# Patient Record
Sex: FEMALE | Race: WHITE | NOT HISPANIC OR LATINO | Employment: OTHER | URBAN - METROPOLITAN AREA
[De-identification: names, ages, dates, MRNs, and addresses within clinical notes are randomized per-mention and may not be internally consistent; named-entity substitution may affect disease eponyms.]

---

## 2022-08-11 ENCOUNTER — APPOINTMENT (OUTPATIENT)
Dept: LAB | Facility: HOSPITAL | Age: 67
End: 2022-08-11
Attending: ORTHOPAEDIC SURGERY
Payer: MEDICARE

## 2022-08-11 ENCOUNTER — HOSPITAL ENCOUNTER (OUTPATIENT)
Dept: RADIOLOGY | Facility: CLINIC | Age: 67
Discharge: HOME | End: 2022-08-11
Attending: NURSE PRACTITIONER
Payer: MEDICARE

## 2022-08-11 ENCOUNTER — OFFICE VISIT (OUTPATIENT)
Dept: ORTHOPEDICS | Facility: CLINIC | Age: 67
End: 2022-08-11
Payer: MEDICARE

## 2022-08-11 VITALS — WEIGHT: 250 LBS | HEIGHT: 72 IN | BODY MASS INDEX: 33.86 KG/M2

## 2022-08-11 DIAGNOSIS — S82.231A DISPLACED OBLIQUE FRACTURE OF SHAFT OF RIGHT TIBIA, INITIAL ENCOUNTER FOR CLOSED FRACTURE: ICD-10-CM

## 2022-08-11 DIAGNOSIS — S82.231A DISPLACED OBLIQUE FRACTURE OF SHAFT OF RIGHT TIBIA, INITIAL ENCOUNTER FOR CLOSED FRACTURE: Primary | ICD-10-CM

## 2022-08-11 PROCEDURE — 73590 XR TIBIA FIBULA 2 VIEW RIGHT: ICD-10-PCS | Mod: RT,,, | Performed by: NURSE PRACTITIONER

## 2022-08-11 PROCEDURE — 73610 XR ANKLE COMPLETE 3 VIEW RIGHT: ICD-10-PCS | Mod: RT,,, | Performed by: NURSE PRACTITIONER

## 2022-08-11 PROCEDURE — 99204 OFFICE O/P NEW MOD 45 MIN: CPT | Mod: 57,,, | Performed by: ORTHOPAEDIC SURGERY

## 2022-08-11 PROCEDURE — 73610 X-RAY EXAM OF ANKLE: CPT | Mod: RT,,, | Performed by: NURSE PRACTITIONER

## 2022-08-11 PROCEDURE — 73590 X-RAY EXAM OF LOWER LEG: CPT | Mod: RT,,, | Performed by: NURSE PRACTITIONER

## 2022-08-11 PROCEDURE — 99204 PR OFFICE/OUTPT VISIT, NEW, LEVL IV, 45-59 MIN: ICD-10-PCS | Mod: 57,,, | Performed by: ORTHOPAEDIC SURGERY

## 2022-08-11 RX ORDER — VALACYCLOVIR HYDROCHLORIDE 500 MG/1
500 TABLET, FILM COATED ORAL
COMMUNITY
Start: 2022-07-13

## 2022-08-11 RX ORDER — MUPIROCIN 20 MG/G
OINTMENT TOPICAL
Status: CANCELLED | OUTPATIENT
Start: 2022-08-11

## 2022-08-11 RX ORDER — TRAZODONE HYDROCHLORIDE 100 MG/1
100 TABLET ORAL NIGHTLY
COMMUNITY
Start: 2022-07-19

## 2022-08-11 RX ORDER — HYDROCODONE BITARTRATE AND ACETAMINOPHEN 10; 325 MG/1; MG/1
1 TABLET ORAL 3 TIMES DAILY PRN
COMMUNITY
Start: 2022-08-08

## 2022-08-11 RX ORDER — LOPERAMIDE HYDROCHLORIDE 2 MG/1
2 CAPSULE ORAL 3 TIMES DAILY PRN
COMMUNITY
Start: 2022-08-08

## 2022-08-11 RX ORDER — RALOXIFENE HYDROCHLORIDE 60 MG/1
60 TABLET, FILM COATED ORAL DAILY
COMMUNITY
Start: 2022-08-08

## 2022-08-11 RX ORDER — BUPROPION HYDROCHLORIDE 100 MG/1
100 TABLET ORAL 2 TIMES DAILY
COMMUNITY
Start: 2022-07-25

## 2022-08-11 RX ORDER — LEVOCETIRIZINE DIHYDROCHLORIDE 5 MG/1
2.5 TABLET, FILM COATED ORAL
COMMUNITY
Start: 2022-07-13

## 2022-08-11 RX ORDER — FOLIC ACID 1 MG/1
1 TABLET ORAL
COMMUNITY
Start: 2022-07-13

## 2022-08-11 RX ORDER — BUSPIRONE HYDROCHLORIDE 10 MG/1
10 TABLET ORAL 2 TIMES DAILY
COMMUNITY
Start: 2022-07-11

## 2022-08-11 RX ORDER — GABAPENTIN 800 MG/1
800 TABLET ORAL 3 TIMES DAILY
COMMUNITY
Start: 2022-07-29

## 2022-08-11 NOTE — H&P (VIEW-ONLY)
Subjective:       Patient ID: Felicia Mae is a 67 y.o. female.    Chief Complaint   Patient presents with    Follow-up     4weeks right pilon fx, sx 7/14/22, pain all around the foot, nwb in wheelchair        Patient is here today for initial evaluation of injuries sustained to her right ankle.  She has a spiral distal tibia fracture with an old malunion of a medial malleolus and posterior malleolus fractures that occurred years ago.  She was seen and evaluated at an outside hospital and had a external fixator placed.  She was referred to me for management however she has been delayed due to difficulties with transportation.  She reports that she has pain and bloody drainage from her pin sites.  She has a very poor historian and has a poor understanding of her injury and the urgency to get it fixed.  She has no showed/rescheduled several appointments prior to this on.    Follow-up  Pertinent negatives include no abdominal pain, chest pain, chills, congestion, coughing, fever, nausea, neck pain, numbness or vomiting.       Review of Systems   Constitutional: Negative for chills, fever and malaise/fatigue.   HENT: Negative for congestion and hearing loss.    Eyes: Negative for visual disturbance.   Cardiovascular: Negative for chest pain and syncope.   Respiratory: Negative for cough and shortness of breath.    Hematologic/Lymphatic: Does not bruise/bleed easily.   Skin: Negative for color change and suspicious lesions.   Musculoskeletal: Negative for falls and neck pain.   Gastrointestinal: Negative for abdominal pain, nausea and vomiting.   Genitourinary: Negative for dysuria and hematuria.   Neurological: Negative for numbness and sensory change.   Psychiatric/Behavioral: Negative for altered mental status. The patient is not nervous/anxious.         Current Outpatient Medications on File Prior to Visit   Medication Sig Dispense Refill    buPROPion (WELLBUTRIN) 100 MG tablet Take 100 mg by mouth 2 (two)  "times daily.      busPIRone (BUSPAR) 10 MG tablet Take 10 mg by mouth 2 (two) times daily.      cetirizine 10 mg Cap Take 10 mg by mouth.      folic acid (FOLVITE) 1 MG tablet Take 1 mg by mouth.      gabapentin (NEURONTIN) 800 MG tablet Take 800 mg by mouth 3 (three) times daily.      HYDROcodone-acetaminophen (NORCO)  mg per tablet Take 1 tablet by mouth 3 (three) times daily as needed.      levocetirizine (XYZAL) 5 MG tablet Take 2.5 mg by mouth.      loperamide (IMODIUM) 2 mg capsule Take 2 mg by mouth 3 (three) times daily as needed.      raloxifene (EVISTA) 60 mg tablet Take 60 mg by mouth once daily.      traZODone (DESYREL) 100 MG tablet Take 100 mg by mouth nightly.      valACYclovir (VALTREX) 500 MG tablet Take 500 mg by mouth.       No current facility-administered medications on file prior to visit.          Objective:      Ht 6' 2" (1.88 m)   Wt 113.4 kg (250 lb)   BMI 32.10 kg/m²   Physical Exam  Constitutional:       General: She is not in acute distress.     Appearance: Normal appearance. She is obese. She is not ill-appearing.   HENT:      Head: Normocephalic and atraumatic.      Nose: No congestion.   Eyes:      Extraocular Movements: Extraocular movements intact.   Cardiovascular:      Rate and Rhythm: Normal rate and regular rhythm.      Pulses: Normal pulses.   Pulmonary:      Effort: Pulmonary effort is normal.      Breath sounds: Normal breath sounds.   Abdominal:      General: There is no distension.      Palpations: Abdomen is soft.      Tenderness: There is no abdominal tenderness.   Musculoskeletal:      Comments: Right lower extremity:  External fixator in place.  She has scabs with bloody drainage around the heel.  No erythema, no purulence.  She has generalized edema in the limb with ecchymosis over the dorsal aspect of the foot.  No blistering noted.  She has got good range of motion of the knee.  No calf swelling or tenderness, no signs of DVT.  She can move all of " her digits.  She has a palpable DP pulse.   Skin:     General: Skin is warm and dry.   Neurological:      Mental Status: She is alert and oriented to person, place, and time. Mental status is at baseline.   Psychiatric:         Mood and Affect: Mood normal.         Behavior: Behavior normal.         Thought Content: Thought content normal.         Judgment: Judgment normal.        Body mass index is 32.1 kg/m².    Radiology:   Right tibia two views, right ankle three views:  Images obtained today in the office demonstrate a spiral fracture of the distal tibia extending down into the metaphysis with an old malunion of her medial malleolus and posterior malleolus with valgus alignment of the ankle joint.      Assessment:         1. Displaced oblique fracture of shaft of right tibia, initial encounter for closed fracture  X-Ray Tibia Fibula 2 View Right    X-Ray Ankle Complete Right           Plan:       The patient is a she has no cardiopulmonary disease.  She has been treated in the past for breast cancer with lumpectomy and tolerated anesthesia well.  She is not on any med blood thinners currently.    The risks, benefits and alternatives treatment were discussed at length with the patient today including but not limited to pain, bleeding, scarring, infection, damage to neurovascular structures, malunion/nonunion, hardware failure/irritation, need for future procedures and complications leading to amputation and even death.  I feel as though she would benefit from removal of her external fixator with application of an intramedullary nail to stabilize her tibia fracture.  Plan to take her to the operating room tomorrow.  She will need to be NPO after midnight tonight.  We will obtain preoperative labs today.  She and her family understand and agree with our plan.     She currently receives [place] from her primary care physician and has received it each month for years.  I will not be providing her with any narcotic  pain medication.  Plan to take her to the operating room tomorrow.  They understand agree with all that we discussed.      Kike Dunbar MD  Orthopedic Trauma  [facility]      No follow-ups on file.    Displaced oblique fracture of shaft of right tibia, initial encounter for closed fracture  -     X-Ray Tibia Fibula 2 View Right; Future; Expected date: 08/11/2022  -     X-Ray Ankle Complete Right; Future; Expected date: 08/11/2022              Orders Placed This Encounter   Procedures    X-Ray Tibia Fibula 2 View Right     Standing Status:   Future     Number of Occurrences:   1     Standing Expiration Date:   8/11/2023     Order Specific Question:   May the Radiologist modify the order per protocol to meet the clinical needs of the patient?     Answer:   Yes     Order Specific Question:   Release to patient     Answer:   Immediate    X-Ray Ankle Complete Right     Standing Status:   Future     Number of Occurrences:   1     Standing Expiration Date:   8/11/2023     Order Specific Question:   May the Radiologist modify the order per protocol to meet the clinical needs of the patient?     Answer:   Yes     Order Specific Question:   Release to patient     Answer:   Immediate       No future appointments.

## 2022-08-11 NOTE — PROGRESS NOTES
Subjective:       Patient ID: Felicia Mae is a 67 y.o. female.    Chief Complaint   Patient presents with    Follow-up     4weeks right pilon fx, sx 7/14/22, pain all around the foot, nwb in wheelchair        Patient is here today for initial evaluation of injuries sustained to her right ankle.  She has a spiral distal tibia fracture with an old malunion of a medial malleolus and posterior malleolus fractures that occurred years ago.  She was seen and evaluated at an outside hospital and had a external fixator placed.  She was referred to me for management however she has been delayed due to difficulties with transportation.  She reports that she has pain and bloody drainage from her pin sites.  She has a very poor historian and has a poor understanding of her injury and the urgency to get it fixed.  She has no showed/rescheduled several appointments prior to this on.    Follow-up  Pertinent negatives include no abdominal pain, chest pain, chills, congestion, coughing, fever, nausea, neck pain, numbness or vomiting.       Review of Systems   Constitutional: Negative for chills, fever and malaise/fatigue.   HENT: Negative for congestion and hearing loss.    Eyes: Negative for visual disturbance.   Cardiovascular: Negative for chest pain and syncope.   Respiratory: Negative for cough and shortness of breath.    Hematologic/Lymphatic: Does not bruise/bleed easily.   Skin: Negative for color change and suspicious lesions.   Musculoskeletal: Negative for falls and neck pain.   Gastrointestinal: Negative for abdominal pain, nausea and vomiting.   Genitourinary: Negative for dysuria and hematuria.   Neurological: Negative for numbness and sensory change.   Psychiatric/Behavioral: Negative for altered mental status. The patient is not nervous/anxious.         Current Outpatient Medications on File Prior to Visit   Medication Sig Dispense Refill    buPROPion (WELLBUTRIN) 100 MG tablet Take 100 mg by mouth 2 (two)  "times daily.      busPIRone (BUSPAR) 10 MG tablet Take 10 mg by mouth 2 (two) times daily.      cetirizine 10 mg Cap Take 10 mg by mouth.      folic acid (FOLVITE) 1 MG tablet Take 1 mg by mouth.      gabapentin (NEURONTIN) 800 MG tablet Take 800 mg by mouth 3 (three) times daily.      HYDROcodone-acetaminophen (NORCO)  mg per tablet Take 1 tablet by mouth 3 (three) times daily as needed.      levocetirizine (XYZAL) 5 MG tablet Take 2.5 mg by mouth.      loperamide (IMODIUM) 2 mg capsule Take 2 mg by mouth 3 (three) times daily as needed.      raloxifene (EVISTA) 60 mg tablet Take 60 mg by mouth once daily.      traZODone (DESYREL) 100 MG tablet Take 100 mg by mouth nightly.      valACYclovir (VALTREX) 500 MG tablet Take 500 mg by mouth.       No current facility-administered medications on file prior to visit.          Objective:      Ht 6' 2" (1.88 m)   Wt 113.4 kg (250 lb)   BMI 32.10 kg/m²   Physical Exam  Constitutional:       General: She is not in acute distress.     Appearance: Normal appearance. She is obese. She is not ill-appearing.   HENT:      Head: Normocephalic and atraumatic.      Nose: No congestion.   Eyes:      Extraocular Movements: Extraocular movements intact.   Cardiovascular:      Rate and Rhythm: Normal rate and regular rhythm.      Pulses: Normal pulses.   Pulmonary:      Effort: Pulmonary effort is normal.      Breath sounds: Normal breath sounds.   Abdominal:      General: There is no distension.      Palpations: Abdomen is soft.      Tenderness: There is no abdominal tenderness.   Musculoskeletal:      Comments: Right lower extremity:  External fixator in place.  She has scabs with bloody drainage around the heel.  No erythema, no purulence.  She has generalized edema in the limb with ecchymosis over the dorsal aspect of the foot.  No blistering noted.  She has got good range of motion of the knee.  No calf swelling or tenderness, no signs of DVT.  She can move all of " her digits.  She has a palpable DP pulse.   Skin:     General: Skin is warm and dry.   Neurological:      Mental Status: She is alert and oriented to person, place, and time. Mental status is at baseline.   Psychiatric:         Mood and Affect: Mood normal.         Behavior: Behavior normal.         Thought Content: Thought content normal.         Judgment: Judgment normal.        Body mass index is 32.1 kg/m².    Radiology:   Right tibia two views, right ankle three views:  Images obtained today in the office demonstrate a spiral fracture of the distal tibia extending down into the metaphysis with an old malunion of her medial malleolus and posterior malleolus with valgus alignment of the ankle joint.      Assessment:         1. Displaced oblique fracture of shaft of right tibia, initial encounter for closed fracture  X-Ray Tibia Fibula 2 View Right    X-Ray Ankle Complete Right           Plan:       The patient is a she has no cardiopulmonary disease.  She has been treated in the past for breast cancer with lumpectomy and tolerated anesthesia well.  She is not on any med blood thinners currently.    The risks, benefits and alternatives treatment were discussed at length with the patient today including but not limited to pain, bleeding, scarring, infection, damage to neurovascular structures, malunion/nonunion, hardware failure/irritation, need for future procedures and complications leading to amputation and even death.  I feel as though she would benefit from removal of her external fixator with application of an intramedullary nail to stabilize her tibia fracture.  Plan to take her to the operating room tomorrow.  She will need to be NPO after midnight tonight.  We will obtain preoperative labs today.  She and her family understand and agree with our plan.     She currently receives [place] from her primary care physician and has received it each month for years.  I will not be providing her with any narcotic  pain medication.  Plan to take her to the operating room tomorrow.  They understand agree with all that we discussed.      Kike Dunbar MD  Orthopedic Trauma  [facility]      No follow-ups on file.    Displaced oblique fracture of shaft of right tibia, initial encounter for closed fracture  -     X-Ray Tibia Fibula 2 View Right; Future; Expected date: 08/11/2022  -     X-Ray Ankle Complete Right; Future; Expected date: 08/11/2022              Orders Placed This Encounter   Procedures    X-Ray Tibia Fibula 2 View Right     Standing Status:   Future     Number of Occurrences:   1     Standing Expiration Date:   8/11/2023     Order Specific Question:   May the Radiologist modify the order per protocol to meet the clinical needs of the patient?     Answer:   Yes     Order Specific Question:   Release to patient     Answer:   Immediate    X-Ray Ankle Complete Right     Standing Status:   Future     Number of Occurrences:   1     Standing Expiration Date:   8/11/2023     Order Specific Question:   May the Radiologist modify the order per protocol to meet the clinical needs of the patient?     Answer:   Yes     Order Specific Question:   Release to patient     Answer:   Immediate       No future appointments.

## 2022-08-12 ENCOUNTER — ANESTHESIA (OUTPATIENT)
Dept: SURGERY | Facility: HOSPITAL | Age: 67
End: 2022-08-12
Payer: MEDICARE

## 2022-08-12 ENCOUNTER — ANESTHESIA EVENT (OUTPATIENT)
Dept: SURGERY | Facility: HOSPITAL | Age: 67
End: 2022-08-12
Payer: MEDICARE

## 2022-08-12 ENCOUNTER — HOSPITAL ENCOUNTER (OUTPATIENT)
Facility: HOSPITAL | Age: 67
Discharge: HOME | End: 2022-08-12
Attending: ORTHOPAEDIC SURGERY | Admitting: ORTHOPAEDIC SURGERY
Payer: MEDICARE

## 2022-08-12 VITALS
HEART RATE: 77 BPM | DIASTOLIC BLOOD PRESSURE: 82 MMHG | RESPIRATION RATE: 15 BRPM | HEIGHT: 72 IN | SYSTOLIC BLOOD PRESSURE: 118 MMHG | OXYGEN SATURATION: 96 % | TEMPERATURE: 97 F | BODY MASS INDEX: 32.1 KG/M2

## 2022-08-12 DIAGNOSIS — S82.231A DISPLACED OBLIQUE FRACTURE OF SHAFT OF RIGHT TIBIA, INITIAL ENCOUNTER FOR CLOSED FRACTURE: ICD-10-CM

## 2022-08-12 PROCEDURE — 36000710: Performed by: ORTHOPAEDIC SURGERY

## 2022-08-12 PROCEDURE — C1769 GUIDE WIRE: HCPCS | Performed by: ORTHOPAEDIC SURGERY

## 2022-08-12 PROCEDURE — 63600175 PHARM REV CODE 636 W HCPCS: Performed by: ANESTHESIOLOGY

## 2022-08-12 PROCEDURE — 25000003 PHARM REV CODE 250: Performed by: ORTHOPAEDIC SURGERY

## 2022-08-12 PROCEDURE — 71000016 HC POSTOP RECOV ADDL HR: Performed by: ORTHOPAEDIC SURGERY

## 2022-08-12 PROCEDURE — 63600175 PHARM REV CODE 636 W HCPCS: Performed by: ORTHOPAEDIC SURGERY

## 2022-08-12 PROCEDURE — 37000008 HC ANESTHESIA 1ST 15 MINUTES: Performed by: ORTHOPAEDIC SURGERY

## 2022-08-12 PROCEDURE — C1713 ANCHOR/SCREW BN/BN,TIS/BN: HCPCS | Performed by: ORTHOPAEDIC SURGERY

## 2022-08-12 PROCEDURE — 27759 PR TREAT TIBIAL SHAFT FX, INTRAMED IMPLANT: ICD-10-PCS | Mod: RT,,, | Performed by: ORTHOPAEDIC SURGERY

## 2022-08-12 PROCEDURE — 20694 RMVL EXT FIXJ SYS UNDER ANES: CPT | Mod: 51,RT,, | Performed by: ORTHOPAEDIC SURGERY

## 2022-08-12 PROCEDURE — 27759 TREATMENT OF TIBIA FRACTURE: CPT | Mod: RT,,, | Performed by: ORTHOPAEDIC SURGERY

## 2022-08-12 PROCEDURE — 25000003 PHARM REV CODE 250: Performed by: ANESTHESIOLOGY

## 2022-08-12 PROCEDURE — 71000015 HC POSTOP RECOV 1ST HR: Performed by: ORTHOPAEDIC SURGERY

## 2022-08-12 PROCEDURE — 36000711: Performed by: ORTHOPAEDIC SURGERY

## 2022-08-12 PROCEDURE — 27201423 OPTIME MED/SURG SUP & DEVICES STERILE SUPPLY: Performed by: ORTHOPAEDIC SURGERY

## 2022-08-12 PROCEDURE — 20694 PR REMOVE EXTERN BONE FIX DEV W ANESTH: ICD-10-PCS | Mod: 51,RT,, | Performed by: ORTHOPAEDIC SURGERY

## 2022-08-12 PROCEDURE — 27800903 OPTIME MED/SURG SUP & DEVICES OTHER IMPLANTS: Performed by: ORTHOPAEDIC SURGERY

## 2022-08-12 PROCEDURE — 25000003 PHARM REV CODE 250: Performed by: NURSE ANESTHETIST, CERTIFIED REGISTERED

## 2022-08-12 PROCEDURE — 63600175 PHARM REV CODE 636 W HCPCS: Performed by: STUDENT IN AN ORGANIZED HEALTH CARE EDUCATION/TRAINING PROGRAM

## 2022-08-12 PROCEDURE — 25000003 PHARM REV CODE 250: Performed by: STUDENT IN AN ORGANIZED HEALTH CARE EDUCATION/TRAINING PROGRAM

## 2022-08-12 PROCEDURE — 27759 TREATMENT OF TIBIA FRACTURE: CPT | Mod: AS,RT,, | Performed by: NURSE PRACTITIONER

## 2022-08-12 PROCEDURE — 71000033 HC RECOVERY, INTIAL HOUR: Performed by: ORTHOPAEDIC SURGERY

## 2022-08-12 PROCEDURE — 25000003 PHARM REV CODE 250

## 2022-08-12 PROCEDURE — 27759 PR TREAT TIBIAL SHAFT FX, INTRAMED IMPLANT: ICD-10-PCS | Mod: AS,RT,, | Performed by: NURSE PRACTITIONER

## 2022-08-12 PROCEDURE — 37000009 HC ANESTHESIA EA ADD 15 MINS: Performed by: ORTHOPAEDIC SURGERY

## 2022-08-12 PROCEDURE — 63600175 PHARM REV CODE 636 W HCPCS

## 2022-08-12 PROCEDURE — 63600175 PHARM REV CODE 636 W HCPCS: Performed by: NURSE ANESTHETIST, CERTIFIED REGISTERED

## 2022-08-12 DEVICE — IMPLANTABLE DEVICE: Type: IMPLANTABLE DEVICE | Site: LEG | Status: FUNCTIONAL

## 2022-08-12 DEVICE — SCREW XL25 IM 36X5MM: Type: IMPLANTABLE DEVICE | Site: LEG | Status: FUNCTIONAL

## 2022-08-12 DEVICE — SCREW LOK LP 5.0X34MM: Type: IMPLANTABLE DEVICE | Site: LEG | Status: FUNCTIONAL

## 2022-08-12 DEVICE — SCREW XL25 IM LOCK 32X5MM: Type: IMPLANTABLE DEVICE | Site: LEG | Status: FUNCTIONAL

## 2022-08-12 RX ORDER — FENTANYL CITRATE 50 UG/ML
INJECTION, SOLUTION INTRAMUSCULAR; INTRAVENOUS
Status: DISCONTINUED | OUTPATIENT
Start: 2022-08-12 | End: 2022-08-12

## 2022-08-12 RX ORDER — SODIUM CHLORIDE, SODIUM GLUCONATE, SODIUM ACETATE, POTASSIUM CHLORIDE AND MAGNESIUM CHLORIDE 30; 37; 368; 526; 502 MG/100ML; MG/100ML; MG/100ML; MG/100ML; MG/100ML
1000 INJECTION, SOLUTION INTRAVENOUS CONTINUOUS
Status: DISCONTINUED | OUTPATIENT
Start: 2022-08-12 | End: 2022-08-12 | Stop reason: HOSPADM

## 2022-08-12 RX ORDER — KETOROLAC TROMETHAMINE 30 MG/ML
30 INJECTION, SOLUTION INTRAMUSCULAR; INTRAVENOUS ONCE
Status: COMPLETED | OUTPATIENT
Start: 2022-08-12 | End: 2022-08-12

## 2022-08-12 RX ORDER — MIDAZOLAM HYDROCHLORIDE 1 MG/ML
2 INJECTION INTRAMUSCULAR; INTRAVENOUS
Status: DISCONTINUED | OUTPATIENT
Start: 2022-08-12 | End: 2022-08-12 | Stop reason: HOSPADM

## 2022-08-12 RX ORDER — CEFAZOLIN SODIUM 2 G/50ML
2 SOLUTION INTRAVENOUS
Status: COMPLETED | OUTPATIENT
Start: 2022-08-12 | End: 2022-08-12

## 2022-08-12 RX ORDER — MUPIROCIN 20 MG/G
OINTMENT TOPICAL
Status: DISCONTINUED | OUTPATIENT
Start: 2022-08-12 | End: 2022-08-12 | Stop reason: HOSPADM

## 2022-08-12 RX ORDER — HYDROMORPHONE HYDROCHLORIDE 2 MG/ML
INJECTION, SOLUTION INTRAMUSCULAR; INTRAVENOUS; SUBCUTANEOUS
Status: DISCONTINUED | OUTPATIENT
Start: 2022-08-12 | End: 2022-08-12

## 2022-08-12 RX ORDER — ONDANSETRON 4 MG/1
4 TABLET, ORALLY DISINTEGRATING ORAL
Status: DISCONTINUED | OUTPATIENT
Start: 2022-08-12 | End: 2022-08-12 | Stop reason: HOSPADM

## 2022-08-12 RX ORDER — METHOCARBAMOL 750 MG/1
750 TABLET, FILM COATED ORAL 3 TIMES DAILY PRN
Qty: 21 TABLET | Refills: 0 | Status: SHIPPED | OUTPATIENT
Start: 2022-08-12 | End: 2022-08-19

## 2022-08-12 RX ORDER — GABAPENTIN 300 MG/1
300 CAPSULE ORAL
Status: DISCONTINUED | OUTPATIENT
Start: 2022-08-12 | End: 2022-08-12 | Stop reason: HOSPADM

## 2022-08-12 RX ORDER — METHOCARBAMOL 100 MG/ML
1000 INJECTION, SOLUTION INTRAMUSCULAR; INTRAVENOUS ONCE
Status: COMPLETED | OUTPATIENT
Start: 2022-08-12 | End: 2022-08-12

## 2022-08-12 RX ORDER — PROCHLORPERAZINE EDISYLATE 5 MG/ML
5 INJECTION INTRAMUSCULAR; INTRAVENOUS EVERY 30 MIN PRN
Status: DISCONTINUED | OUTPATIENT
Start: 2022-08-12 | End: 2022-08-12 | Stop reason: HOSPADM

## 2022-08-12 RX ORDER — ONDANSETRON HYDROCHLORIDE 2 MG/ML
INJECTION, SOLUTION INTRAMUSCULAR; INTRAVENOUS
Status: DISCONTINUED | OUTPATIENT
Start: 2022-08-12 | End: 2022-08-12

## 2022-08-12 RX ORDER — PROPOFOL 10 MG/ML
VIAL (ML) INTRAVENOUS
Status: DISCONTINUED | OUTPATIENT
Start: 2022-08-12 | End: 2022-08-12

## 2022-08-12 RX ORDER — KETOROLAC TROMETHAMINE 30 MG/ML
15 INJECTION, SOLUTION INTRAMUSCULAR; INTRAVENOUS EVERY 6 HOURS PRN
Status: DISCONTINUED | OUTPATIENT
Start: 2022-08-12 | End: 2022-08-12 | Stop reason: HOSPADM

## 2022-08-12 RX ORDER — LABETALOL HYDROCHLORIDE 5 MG/ML
INJECTION, SOLUTION INTRAVENOUS
Status: COMPLETED
Start: 2022-08-12 | End: 2022-08-12

## 2022-08-12 RX ORDER — ONDANSETRON 2 MG/ML
4 INJECTION INTRAMUSCULAR; INTRAVENOUS DAILY PRN
Status: DISCONTINUED | OUTPATIENT
Start: 2022-08-12 | End: 2022-08-12 | Stop reason: HOSPADM

## 2022-08-12 RX ORDER — SODIUM CHLORIDE, SODIUM LACTATE, POTASSIUM CHLORIDE, CALCIUM CHLORIDE 600; 310; 30; 20 MG/100ML; MG/100ML; MG/100ML; MG/100ML
1000 INJECTION, SOLUTION INTRAVENOUS ONCE
Status: DISCONTINUED | OUTPATIENT
Start: 2022-08-12 | End: 2022-08-12 | Stop reason: HOSPADM

## 2022-08-12 RX ORDER — LABETALOL HYDROCHLORIDE 5 MG/ML
20 INJECTION, SOLUTION INTRAVENOUS ONCE
Status: COMPLETED | OUTPATIENT
Start: 2022-08-12 | End: 2022-08-12

## 2022-08-12 RX ORDER — HYDROMORPHONE HYDROCHLORIDE 2 MG/ML
0.4 INJECTION, SOLUTION INTRAMUSCULAR; INTRAVENOUS; SUBCUTANEOUS EVERY 5 MIN PRN
Status: DISCONTINUED | OUTPATIENT
Start: 2022-08-12 | End: 2022-08-12 | Stop reason: HOSPADM

## 2022-08-12 RX ORDER — METHOCARBAMOL 100 MG/ML
INJECTION, SOLUTION INTRAMUSCULAR; INTRAVENOUS
Status: COMPLETED
Start: 2022-08-12 | End: 2022-08-12

## 2022-08-12 RX ORDER — MORPHINE SULFATE 10 MG/ML
4 INJECTION INTRAMUSCULAR; INTRAVENOUS; SUBCUTANEOUS EVERY 4 HOURS PRN
Status: DISCONTINUED | OUTPATIENT
Start: 2022-08-12 | End: 2022-08-12 | Stop reason: HOSPADM

## 2022-08-12 RX ORDER — KETOROLAC TROMETHAMINE 10 MG/1
10 TABLET, FILM COATED ORAL EVERY 6 HOURS
Qty: 20 TABLET | Refills: 0 | Status: SHIPPED | OUTPATIENT
Start: 2022-08-12 | End: 2022-08-17

## 2022-08-12 RX ORDER — HYDROMORPHONE HYDROCHLORIDE 2 MG/ML
INJECTION, SOLUTION INTRAMUSCULAR; INTRAVENOUS; SUBCUTANEOUS
Status: COMPLETED
Start: 2022-08-12 | End: 2022-08-12

## 2022-08-12 RX ORDER — HYDROCODONE BITARTRATE AND ACETAMINOPHEN 10; 325 MG/1; MG/1
1 TABLET ORAL EVERY 4 HOURS PRN
Status: DISCONTINUED | OUTPATIENT
Start: 2022-08-12 | End: 2022-08-12 | Stop reason: HOSPADM

## 2022-08-12 RX ORDER — CELECOXIB 200 MG/1
200 CAPSULE ORAL
Status: DISCONTINUED | OUTPATIENT
Start: 2022-08-12 | End: 2022-08-12 | Stop reason: HOSPADM

## 2022-08-12 RX ORDER — ACETAMINOPHEN 500 MG
1000 TABLET ORAL
Status: DISCONTINUED | OUTPATIENT
Start: 2022-08-12 | End: 2022-08-12 | Stop reason: HOSPADM

## 2022-08-12 RX ORDER — ROCURONIUM BROMIDE 10 MG/ML
INJECTION, SOLUTION INTRAVENOUS
Status: DISCONTINUED | OUTPATIENT
Start: 2022-08-12 | End: 2022-08-12

## 2022-08-12 RX ADMIN — ONDANSETRON 4 MG: 2 INJECTION INTRAMUSCULAR; INTRAVENOUS at 01:08

## 2022-08-12 RX ADMIN — LABETALOL HYDROCHLORIDE 20 MG: 5 INJECTION, SOLUTION INTRAVENOUS at 02:08

## 2022-08-12 RX ADMIN — HYDROMORPHONE HYDROCHLORIDE 0.4 MG: 2 INJECTION, SOLUTION INTRAMUSCULAR; INTRAVENOUS; SUBCUTANEOUS at 02:08

## 2022-08-12 RX ADMIN — FENTANYL CITRATE 100 MCG: 50 INJECTION, SOLUTION INTRAMUSCULAR; INTRAVENOUS at 11:08

## 2022-08-12 RX ADMIN — FENTANYL CITRATE 50 MCG: 50 INJECTION, SOLUTION INTRAMUSCULAR; INTRAVENOUS at 12:08

## 2022-08-12 RX ADMIN — SUGAMMADEX 200 MG: 100 INJECTION, SOLUTION INTRAVENOUS at 01:08

## 2022-08-12 RX ADMIN — MUPIROCIN: 20 OINTMENT TOPICAL at 09:08

## 2022-08-12 RX ADMIN — HYDROMORPHONE HYDROCHLORIDE 0.5 MG: 2 INJECTION INTRAMUSCULAR; INTRAVENOUS; SUBCUTANEOUS at 01:08

## 2022-08-12 RX ADMIN — ROCURONIUM BROMIDE 50 MG: 10 SOLUTION INTRAVENOUS at 11:08

## 2022-08-12 RX ADMIN — METHOCARBAMOL 1000 MG: 100 INJECTION, SOLUTION INTRAMUSCULAR; INTRAVENOUS at 02:08

## 2022-08-12 RX ADMIN — ACETAMINOPHEN 1000 MG: 500 TABLET, FILM COATED ORAL at 10:08

## 2022-08-12 RX ADMIN — CEFAZOLIN SODIUM 2 G: 2 SOLUTION INTRAVENOUS at 12:08

## 2022-08-12 RX ADMIN — SODIUM CHLORIDE, SODIUM GLUCONATE, SODIUM ACETATE, POTASSIUM CHLORIDE AND MAGNESIUM CHLORIDE: 526; 502; 368; 37; 30 INJECTION, SOLUTION INTRAVENOUS at 11:08

## 2022-08-12 RX ADMIN — KETOROLAC TROMETHAMINE 30 MG: 30 INJECTION, SOLUTION INTRAMUSCULAR at 02:08

## 2022-08-12 RX ADMIN — PROPOFOL 150 MG: 10 INJECTION, EMULSION INTRAVENOUS at 11:08

## 2022-08-12 RX ADMIN — GABAPENTIN 300 MG: 300 CAPSULE ORAL at 10:08

## 2022-08-12 RX ADMIN — CELECOXIB 200 MG: 200 CAPSULE ORAL at 10:08

## 2022-08-12 NOTE — ANESTHESIA PROCEDURE NOTES
Intubation    Date/Time: 8/12/2022 11:56 AM  Performed by: Juan Miguel Lo  Authorized by: Yazmin Sifuentes MD     Intubation:     Induction:  Intravenous    Intubated:  Postinduction    Mask Ventilation:  Easy with oral airway    Attempts:  1    Attempted By:  Student    Method of Intubation:  Direct    Blade:  Katie 3    Laryngeal View Grade: Grade I - full view of cords      Difficult Airway Encountered?: No      Complications:  None    Airway Device:  Oral endotracheal tube    Airway Device Size:  7.5    Style/Cuff Inflation:  Cuffed (inflated to minimal occlusive pressure)    Inflation Amount (mL):  6    Tube secured:  22    Secured at:  The lips    Placement Verified By:  Capnometry    Complicating Factors:  None    Findings Post-Intubation:  BS equal bilateral and atraumatic/condition of teeth unchanged

## 2022-08-12 NOTE — BRIEF OP NOTE
[facility] - Periop Services  Brief Operative Note    Surgery Date: 8/12/2022     Surgeon(s) and Role:     * Kike Dunbar MD - Primary    Assisting Surgeon: None    Pre-op Diagnosis:  Displaced oblique fracture of shaft of right tibia, initial encounter for closed fracture [S82.231A]    Post-op Diagnosis:  Post-Op Diagnosis Codes:     * Displaced oblique fracture of shaft of right tibia, initial encounter for closed fracture [S82.231A]    Procedure(s) (LRB):  INSERTION, INTRAMEDULLARY PJ, TIBIA (Right)  REMOVAL OF EX_FIX R ankle    Anesthesia: General    Operative Findings: see op report    Estimated Blood Loss: 100 mL         Specimens:   Specimen (24h ago, onward)             Start     Ordered    08/12/22 1305  Specimen to Pathology  RELEASE UPON ORDERING        References:    Click here for ordering Quick Tip   Question:  Release to patient  Answer:  Immediate    08/12/22 1305                  Discharge Note    OUTCOME: Patient tolerated treatment/procedure well without complication and is now ready for discharge.    DISPOSITION: Home or Self Care    FINAL DIAGNOSIS:  <principal problem not specified>    FOLLOWUP: In clinic    DISCHARGE INSTRUCTIONS:    Discharge Procedure Orders   Ambulatory referral/consult to Wound Clinic   Standing Status: Future   Referral Priority: Routine Referral Type: Consultation   Referral Reason: Specialty Services Required   Requested Specialty: Wound Care   Number of Visits Requested: 1     Diet Adult Regular     Keep surgical extremity elevated     Ice to affected area     No driving until:     Notify your health care provider if you experience any of the following:  temperature >100.4     Notify your health care provider if you experience any of the following:  severe uncontrolled pain     Notify your health care provider if you experience any of the following:  redness, tenderness, or signs of infection (pain, swelling, redness, odor or green/yellow discharge  around incision site)     Remove dressing in 48 hours   Order Comments: Daily dry dressing changes starting on POD 2     Weight bearing restrictions (specify):   Order Comments: Ok to weight bear to right leg in boot to stand and transfer; no ambulation

## 2022-08-12 NOTE — PROGRESS NOTES
Patient requires 3 person assist to transfer from w/c to bed.  Physical Therapy techs x2 assisted with gait belt and transfer help.  Pt displays minimal effort to assist with transfer.

## 2022-08-12 NOTE — ANESTHESIA PREPROCEDURE EVALUATION
08/12/2022  Felicia Mae is a 67 y.o., female with  Obesity here for right tibia IM corazon.    Chief Complaint   Patient presents with    Follow-up       4weeks right pilon fx, sx 7/14/22, pain all around the foot, nwb in wheelchair         Patient is here today for initial evaluation of injuries sustained to her right ankle.  She has a spiral distal tibia fracture with an old malunion of a medial malleolus and posterior malleolus fractures that occurred years ago.  She was seen and evaluated at an outside hospital and had a external fixator placed.  She was referred to me for management however she has been delayed due to difficulties with transportation.  She reports that she has pain and bloody drainage from her pin sites.  She has a very poor historian and has a poor understanding of her injury and the urgency to get it fixed.  She has no showed/rescheduled several appointments prior to this on.       Assessment:          1. Displaced oblique fracture of shaft of right tibia, initial encounter for closed fracture  X-Ray Tibia Fibula 2 View Right     X-Ray Ankle Complete Right             Plan:       The patient is a she has no cardiopulmonary disease.  She has been treated in the past for breast cancer with lumpectomy and tolerated anesthesia well.  She is not on any med blood thinners currently.     The risks, benefits and alternatives treatment were discussed at length with the patient today including but not limited to pain, bleeding, scarring, infection, damage to neurovascular structures, malunion/nonunion, hardware failure/irritation, need for future procedures and complications leading to amputation and even death.  I feel as though she would benefit from removal of her external fixator with application of an intramedullary nail to stabilize her tibia fracture.  Plan to take her to the operating  room tomorrow.  She will need to be NPO after midnight tonight.  We will obtain preoperative labs today.  She and her family understand and agree with our plan.      She currently receives [place] from her primary care physician and has received it each month for years.  I will not be providing her with any narcotic pain medication.  Plan to take her to the operating room tomorrow.  They understand agree with all that we discussed.        Kike Dunbar MD  Orthopedic Trauma  [facility]              Pre-op Assessment    I have reviewed the NPO Status.      Review of Systems      Physical Exam  General: Cooperative, Alert and Oriented  Obese, flat affect  - not able to answer all questions - daughter was also historian  Airway:  Mallampati: III   Mouth Opening: Normal  TM Distance: Normal  Tongue: Normal  Neck ROM: Normal ROM  Neck: Girth Increased    Dental:  Intact  Mostly missing  Chest/Lungs:  Clear to auscultation, Normal Respiratory Rate    Heart:  Rate: Normal  Rhythm: Regular Rhythm        Anesthesia Plan  Type of Anesthesia, risks & benefits discussed:    Anesthesia Type: Gen ETT  Intra-op Monitoring Plan: Standard ASA Monitors  Post Op Pain Control Plan: multimodal analgesia and IV/PO Opioids PRN  Induction:  IV  Airway Plan: Direct, Post-Induction  Informed Consent: Informed consent signed with the Patient and all parties understand the risks and agree with anesthesia plan.  All questions answered.   ASA Score: 3  Day of Surgery Review of History & Physical: H&P Update referred to the surgeon/provider.  Anesthesia Plan Notes: Premedication: NO VERSED    Special Technique: Preemptive analgesia with Neurontin, zofran, acetaminophen and celebrex  or tramadol  PONV prophylaxis with intraop zofran, decadron     Ready For Surgery From Anesthesia Perspective.     .

## 2022-08-12 NOTE — INTERVAL H&P NOTE
The patient has been examined and the H&P has been reviewed:    I concur with the findings and no changes have occurred since H&P was written.    Surgery risks, benefits and alternative options discussed and understood by patient/family.          There are no hospital problems to display for this patient.    Kike Dunbar MD  Orthopedic Trauma  [facility]

## 2022-08-12 NOTE — OP NOTE
[facility]                       [address] [place] [address]    PATIENT NAME:      CIPRIANO MELENDEZ  YOB: 1955  CSN:               [id number]  MRN:               [id number]  ADMIT DATE:        08/12/2022 08:06:00  PHYSICIAN:         Kike Dunbar MD                          OPERATIVE REPORT      DATE OF SURGERY:    08/12/2022 00:00:00    SURGEON:  Kike Dunbar MD    PREOPERATIVE DIAGNOSIS:  Right spiral tibia shaft fracture.    POSTOPERATIVE DIAGNOSIS:  Right spiral tibia shaft fracture.    PROCEDURE PERFORMED:    1. Intramedullary nailing of right tibia.    2. Removal of external fixator, right lower extremity.    ANESTHESIA:  General.    ESTIMATED BLOOD LOSS:  100 cc.    ASSISTANT:  Sharon Mclain, Nurse Practitioner, necessary for a skilled set of   hands to assist with reduction of the fracture, as well as application of   hardware and deep closure.    IMPLANTS:  Synthes tibial nail, 11 x 360 mm with 4 distal and 2 proximal   interlocking screws.    COMPLICATIONS:  None.    COUNTS:  All counts correct x2 at the end of the case.    INDICATIONS FOR PROCEDURE:  Ms. Melendez is a 67-year-old female who approximately   1 month ago sustained a twisting injury to her right ankle.  She was seen at an   outside hospital and placed into an external fixator, and she was referred to   me for management.  The patient had multiple missed appointments and rescheduled   appointments due to complications with transportation.  The risks and benefits   of treatment were discussed when she finally was able to come to the office and   be seen.  She is brought to the operating room today for removal of external   fixator with stabilization of her fracture using intramedullary nail.    PROCEDURE IN DETAIL:  After informed consent was obtained, the patient was met   in the preoperative holding area.  Her site was marked.  She  was taken to the   operating room.  She was placed supine on the operating table.  General   anesthesia was induced.  All bony prominences were well padded.  Preoperative   antibiotics were given, and her right lower extremity was prepped and draped in   a standard sterile fashion.  A time-out was done to indicate the correct   operative limb and procedure.  An incision was made proximal to the patella, it   was carried down through the quadriceps tendon.  The starting point for a   suprapatellar nail was obtained.  An opening reamer was passed.  The ball-tipped   guidewire was placed centered within the tibial canal.  Distally, the fracture   was held in a reduced position by my assistant.  The distal end of the corazon was   centered within the tibial metaphysis.  The length was measured.  It was reamed   up to 12 mm and an 11 mm nail was malleted into position.  Four distal   interlocking screws were placed in a perfect-[place]oe[place] technique, the most proximal   of which was close to the fracture site; however, it did traverse the cortices   in the distal metaphysis.  They were all confirmed to be in appropriate position   on AP mortise and lateral views.  Two proximal interlocking screws were placed   through the jig.  The jig was removed and final fluoroscopic images were   obtained, and showed adequate alignment.  Please note that prior to application   of the intramedullary nail, her external fixator was removed in its entirety.    The pin sites were debrided using a 15 blade, rongeur, and curette and were   thoroughly irrigated prior to application of the nail.  Final fluoroscopic views   were obtained.  The wounds were irrigated and closed using a #1 Vicryl, 2-0   Monocryl, 2-0 Prolene, and staples.  Xeroform, 4 x 4's, cast padding, Ace   bandage and a CAM boot were applied.  The patient was awakened, extubated, and   taken to Recovery in stable condition.    POSTOPERATIVE PLAN:  She will be discharged home today.   She can stand to   transfer on the right lower extremity with the CAM boot.  She will follow up   with me in 2 weeks for removal of her sutures.  Begin dressing changes tomorrow.    Full range of motion of the knee and ankle and digits.  I have discussed all   this plan of care with her family.  They understand, agree, and all questions   and concerns were addressed.        ______________________________  MD MARIMAR Alba/LOS  DD:  08/12/2022  Time:  01:25PM  DT:  08/12/2022  Time:  01:51PM  Job #:  [id number]      OPERATIVE REPORT

## 2022-08-12 NOTE — TRANSFER OF CARE
"Anesthesia Transfer of Care Note    Patient: Felicia Mae    Procedure(s) Performed: Procedure(s) (LRB):  INSERTION, INTRAMEDULLARY PJ, TIBIA (Right)    Patient location: PACU    Anesthesia Type: general    Transport from OR: Transported from OR on room air with adequate spontaneous ventilation    Post pain: adequate analgesia    Post assessment: no apparent anesthetic complications and tolerated procedure well    Post vital signs: stable    Level of consciousness: sedated    Nausea/Vomiting: no nausea/vomiting    Complications: none    Transfer of care protocol was followed      Last vitals:   Visit Vitals  BP (!) 110/56   Pulse 65   Temp 36 °C (96.8 °F)   Resp 13   Ht 6' 2" (1.88 m)   SpO2 96%   BMI 32.10 kg/m²     "

## 2022-08-12 NOTE — ANESTHESIA POSTPROCEDURE EVALUATION
Anesthesia Post Evaluation    Patient: Felicia Mae    Procedure(s) Performed: Procedure(s) (LRB):  INSERTION, INTRAMEDULLARY PJ, TIBIA (Right)    Final Anesthesia Type: general      Patient location during evaluation: PACU  Patient participation: Yes- Able to Participate  Level of consciousness: awake and alert  Post-procedure vital signs: reviewed and stable  Pain management: adequate  Airway patency: patent    PONV status at discharge: No PONV  Anesthetic complications: no      Respiratory status: unassisted  Hydration status: euvolemic  Follow-up not needed.          Vitals Value Taken Time   /56 08/12/22 1339   Temp 36 °C (96.8 °F) 08/12/22 1339   Pulse 75 08/12/22 1344   Resp 18 08/12/22 1344   SpO2 98 % 08/12/22 1344   Vitals shown include unvalidated device data.      No case tracking events are documented in the log.      Pain/Johnie Score: Pain Rating Prior to Med Admin: 4 (8/12/2022 10:43 AM)

## 2022-08-13 NOTE — OR NURSING
454pm artur santana notified bleed thru ace wrap RLE,moist, advised reinforcement with abd,cast padding and another ace and discharge

## 2022-08-13 NOTE — OR NURSING
Call to patients room 247 to assist with attempting to prep patient for discharge. Upon attempt to provide hyigene care and dress patient, patient very uncooperative and unable to follow commands. Patient unable to roll nor participate in care being performed. Patient requires max assist of 4 persons to roll patient and attempt to perform dressing. With increased attempts and encouragement, patient unable to follow commands and refusing to allow staff to perform any duties. Family attempts to be involved and encourage patient to allow staff to assist with care unsuccessful. Family voices increased concern that they are unable to provide needed and necessary care at home for patient as she lives in a trailer with steps. Today for her to get to hospital son had to provide total body care in lifting her to her wheelchair from bed. Family states that since initial fall patient has declined drastically and spends her days in the bed requiring total care. Family voices increase stress and concern over abilities to meet patients care needs.

## 2022-08-16 LAB
ESTROGEN SERPL-MCNC: NORMAL PG/ML
INSULIN SERPL-ACNC: NORMAL U[IU]/ML
LAB AP CLINICAL INFORMATION: NORMAL
LAB AP GROSS DESCRIPTION: NORMAL
LAB AP REPORT FOOTNOTES: NORMAL
T3RU NFR SERPL: NORMAL %

## 2022-09-28 ENCOUNTER — HOSPITAL ENCOUNTER (OUTPATIENT)
Dept: RADIOLOGY | Facility: CLINIC | Age: 67
Discharge: HOME | End: 2022-09-28
Attending: ORTHOPAEDIC SURGERY
Payer: MEDICARE

## 2022-09-28 ENCOUNTER — OFFICE VISIT (OUTPATIENT)
Dept: ORTHOPEDICS | Facility: CLINIC | Age: 67
End: 2022-09-28
Payer: MEDICARE

## 2022-09-28 VITALS — TEMPERATURE: 98 F

## 2022-09-28 DIAGNOSIS — S82.231D CLOSED DISPLACED OBLIQUE FRACTURE OF SHAFT OF RIGHT TIBIA WITH ROUTINE HEALING: ICD-10-CM

## 2022-09-28 DIAGNOSIS — S82.231D CLOSED DISPLACED OBLIQUE FRACTURE OF SHAFT OF RIGHT TIBIA WITH ROUTINE HEALING: Primary | ICD-10-CM

## 2022-09-28 PROCEDURE — 99024 POSTOP FOLLOW-UP VISIT: CPT | Mod: POP,,, | Performed by: NURSE PRACTITIONER

## 2022-09-28 PROCEDURE — 73590 XR TIBIA FIBULA 2 VIEW RIGHT: ICD-10-PCS | Mod: RT,,, | Performed by: ORTHOPAEDIC SURGERY

## 2022-09-28 PROCEDURE — 73590 X-RAY EXAM OF LOWER LEG: CPT | Mod: RT,,, | Performed by: ORTHOPAEDIC SURGERY

## 2022-09-28 PROCEDURE — 99024 PR POST-OP FOLLOW-UP VISIT: ICD-10-PCS | Mod: POP,,, | Performed by: NURSE PRACTITIONER

## 2022-09-28 RX ORDER — METHOCARBAMOL 750 MG/1
750 TABLET, FILM COATED ORAL 3 TIMES DAILY PRN
Qty: 21 TABLET | Refills: 0 | Status: SHIPPED | OUTPATIENT
Start: 2022-09-28 | End: 2022-10-05

## 2022-09-28 RX ORDER — MIDODRINE HYDROCHLORIDE 5 MG/1
5 TABLET ORAL 2 TIMES DAILY
COMMUNITY
Start: 2022-09-19

## 2022-09-28 NOTE — PROGRESS NOTES
Subjective:       Patient ID: Felicia Mae is a 67 y.o. female.    Chief Complaint   Patient presents with    Right Lower Leg - Injury     6.5 weeks. IMN right tibia fx. Wheelchair. Complains of some pain. Unable to obtain BP.         The patient is here today for a follow-up evaluation 6 weeks out from intramedullary nailing of right tibia fracture.  This is her 1st time following up since her surgery.  She states her sutures were removed while in hospital or inpatient rehab but she can not remember.  She has not had any fever or redness/drainage to her incisions.  She states that she has been wearing her boot as advised.  She has been able to stand and pivot for transfers on her right leg and states that she takes a few steps to get to the restroom etc. she states that she believes she is set up to begin home health physical therapy.  She complains of pain and is requesting Percocet prescription today.  She does not report any other issues.      Review of Systems   Constitutional: Negative for chills and fever.   HENT:  Negative for congestion and hearing loss.    Eyes:  Negative for visual disturbance.   Cardiovascular:  Negative for chest pain and syncope.   Respiratory:  Negative for cough and shortness of breath.    Hematologic/Lymphatic: Does not bruise/bleed easily.   Skin:  Negative for color change and rash.   Gastrointestinal:  Negative for abdominal pain, nausea and vomiting.   Genitourinary:  Negative for dysuria and hematuria.   Neurological:  Negative for numbness, sensory change and weakness.   Psychiatric/Behavioral:  Negative for altered mental status.       Current Outpatient Medications on File Prior to Visit   Medication Sig Dispense Refill    busPIRone (BUSPAR) 10 MG tablet Take 10 mg by mouth 2 (two) times daily.      gabapentin (NEURONTIN) 800 MG tablet Take 800 mg by mouth 3 (three) times daily.      midodrine (PROAMATINE) 5 MG Tab Take 5 mg by mouth 2 (two) times daily.      buPROPion  (WELLBUTRIN) 100 MG tablet Take 100 mg by mouth 2 (two) times daily.      cetirizine 10 mg Cap Take 10 mg by mouth.      folic acid (FOLVITE) 1 MG tablet Take 1 mg by mouth.      HYDROcodone-acetaminophen (NORCO)  mg per tablet Take 1 tablet by mouth 3 (three) times daily as needed.      levocetirizine (XYZAL) 5 MG tablet Take 2.5 mg by mouth.      loperamide (IMODIUM) 2 mg capsule Take 2 mg by mouth 3 (three) times daily as needed.      raloxifene (EVISTA) 60 mg tablet Take 60 mg by mouth once daily.      traZODone (DESYREL) 100 MG tablet Take 100 mg by mouth nightly.      valACYclovir (VALTREX) 500 MG tablet Take 500 mg by mouth.       No current facility-administered medications on file prior to visit.          Objective:      Temp 98 °F (36.7 °C)   Physical Exam  Constitutional:       General: She is not in acute distress.     Appearance: Normal appearance.   HENT:      Head: Normocephalic and atraumatic.      Mouth/Throat:      Mouth: Mucous membranes are moist.   Eyes:      Extraocular Movements: Extraocular movements intact.   Cardiovascular:      Rate and Rhythm: Normal rate.      Pulses: Normal pulses.   Pulmonary:      Effort: Pulmonary effort is normal. No respiratory distress.   Abdominal:      General: There is no distension.      Palpations: Abdomen is soft.      Tenderness: There is no abdominal tenderness.   Musculoskeletal:      Cervical back: Normal range of motion and neck supple.      Comments: Right lower extremity: Surgical incisions are well healed with no signs of infection.  No painful or prominent hardware.  Knee range of motion 0-100 degrees passively.  She can do a straight leg raise.  She has no calf swelling or tenderness.  She can actively dorsiflex and plantar flex her foot.  She does have some stiffness with ankle dorsiflexion.  Palpable DP pulse.  Good range of motion to the digits.  Brisk capillary refill distally.   Neurological:      Mental Status: She is alert and oriented  to person, place, and time. Mental status is at baseline.   Psychiatric:         Mood and Affect: Mood normal.         Behavior: Behavior normal.         Thought Content: Thought content normal.         Judgment: Judgment normal.      There is no height or weight on file to calculate BMI.    Radiology:   AP and lateral x-rays of the right tibia demonstrate stable alignment compared to previous films; hardware intact with no failure or loosening; good interval bone healing; malunion of the medial malleolus which appears old/chronic      Assessment:         1. Closed displaced oblique fracture of shaft of right tibia with routine healing  X-Ray Tibia Fibula 2 View Right    Ambulatory referral/consult to Physical/Occupational Therapy              Plan:     Patient is 6 weeks out from intramedullary nailing of right tibia fracture.  X-rays demonstrate stable alignment and interval bone healing today.  We will allow her to begin ambulating with her cam boot on.  She can remove her Cam boot while at rest.  She will use a walker for balance.  Orders for physical therapy were provided today.  She has requested a refill of Percocet today, but review of her prescription monitoring program profile indicates that she received a 30 day prescription of Norco 10 mg 9 days ago.  I have advised that she can continue her prescribed Norco.  I have sent in a prescription for Robaxin for muscle spasm.  She can also use over-the-counter NSAIDs if needed.  We will see her back in 6 weeks for repeat x-rays and evaluation.  All questions and concerns are addressed with the patient and her family understand and agree.    The above findings, diagnosis, and treatment plan were discussed with Dr. Kike Dunbar who is in agreement.      No follow-ups on file.    Closed displaced oblique fracture of shaft of right tibia with routine healing  -     X-Ray Tibia Fibula 2 View Right; Future; Expected date: 09/28/2022  -     Ambulatory  referral/consult to Physical/Occupational Therapy; Future; Expected date: 10/05/2022    Other orders  -     methocarbamoL (ROBAXIN) 750 MG Tab; Take 1 tablet (750 mg total) by mouth 3 (three) times daily as needed (spasm).  Dispense: 21 tablet; Refill: 0         Medications Ordered This Encounter   Medications    methocarbamoL (ROBAXIN) 750 MG Tab     Sig: Take 1 tablet (750 mg total) by mouth 3 (three) times daily as needed (spasm).     Dispense:  21 tablet     Refill:  0       Orders Placed This Encounter   Procedures    X-Ray Tibia Fibula 2 View Right     Standing Status:   Future     Number of Occurrences:   1     Standing Expiration Date:   9/27/2023     Order Specific Question:   May the Radiologist modify the order per protocol to meet the clinical needs of the patient?     Answer:   Yes     Order Specific Question:   Release to patient     Answer:   Immediate    Ambulatory referral/consult to Physical/Occupational Therapy     Standing Status:   Future     Standing Expiration Date:   10/28/2023     Referral Priority:   Routine     Referral Type:   Physical Medicine     Referral Reason:   Specialty Services Required     Requested Specialty:   Physical Therapy     Number of Visits Requested:   1       Future Appointments   Date Time Provider Department Center   11/9/2022  1:30 PM Kike Dunbar MD [facility]

## (undated) DEVICE — STAPLER SKIN REGULAR

## (undated) DEVICE — GOWN SMARTSLEEVE XXL/XLONG

## (undated) DEVICE — GLOVE PROTEXIS BLUE LATEX 7

## (undated) DEVICE — DRAPE C-ARMOR EQUIPMENT COVER

## (undated) DEVICE — Device

## (undated) DEVICE — GLOVE 8 PROTEXIS PI ORTHO

## (undated) DEVICE — SEE MEDLINE ITEM 157166

## (undated) DEVICE — COVER FULLGUARD SHOE HIGH-TOP

## (undated) DEVICE — WIRE GUIDE 3.2MM 400MM
Type: IMPLANTABLE DEVICE | Site: LEG | Status: NON-FUNCTIONAL
Removed: 2022-08-12

## (undated) DEVICE — BIT DRILL 4.2MM 3 FLUTD 145MM

## (undated) DEVICE — PAD CAST SPECIALIST STRL 4

## (undated) DEVICE — WALKER TRACKER EX LARGE

## (undated) DEVICE — SLEEVE OTR PROTCT STRL 12MM

## (undated) DEVICE — GLOVE PROTEXIS PI CRM 7

## (undated) DEVICE — COVER SHOE FLUID RESISTANT XL

## (undated) DEVICE — PAD CAST SPECIALIST STRL 6

## (undated) DEVICE — APPLICATOR CHLORAPREP ORN 26ML

## (undated) DEVICE — SUT VICRYL BR 1 GEN 27 CT-1

## (undated) DEVICE — SEE MEDLINE ITEM 157125

## (undated) DEVICE — DRESSING XEROFORM 5X9IN

## (undated) DEVICE — DRAPE STERI U-SHAPED 47X51IN

## (undated) DEVICE — SPONGE GAUZE 16PLY 4X4

## (undated) DEVICE — TAPE SILK 3IN

## (undated) DEVICE — DRESSING XEROFORM 1X8IN

## (undated) DEVICE — BANDAGE ACE ELASTIC 6"

## (undated) DEVICE — ELECTRODE REM POLYHESIVE II

## (undated) DEVICE — GLOVE PROTEXIS LTX MICRO 8

## (undated) DEVICE — IMPLANTABLE DEVICE
Type: IMPLANTABLE DEVICE | Site: LEG | Status: NON-FUNCTIONAL
Removed: 2022-08-12